# Patient Record
Sex: MALE | Race: WHITE | Employment: OTHER | URBAN - METROPOLITAN AREA
[De-identification: names, ages, dates, MRNs, and addresses within clinical notes are randomized per-mention and may not be internally consistent; named-entity substitution may affect disease eponyms.]

---

## 2023-07-20 ENCOUNTER — HOSPITAL ENCOUNTER (EMERGENCY)
Age: 53
Discharge: HOME | End: 2023-07-20
Attending: EMERGENCY MEDICINE
Payer: MEDICARE

## 2023-07-20 VITALS
WEIGHT: 165 LBS | HEIGHT: 68 IN | HEART RATE: 89 BPM | TEMPERATURE: 98.1 F | OXYGEN SATURATION: 96 % | RESPIRATION RATE: 16 BRPM | BODY MASS INDEX: 25.01 KG/M2 | SYSTOLIC BLOOD PRESSURE: 151 MMHG | DIASTOLIC BLOOD PRESSURE: 102 MMHG

## 2023-07-20 DIAGNOSIS — F22 EKBOM'S DELUSIONAL PARASITOSIS (HCC): Primary | ICD-10-CM

## 2023-07-20 PROCEDURE — 99283 EMERGENCY DEPT VISIT LOW MDM: CPT

## 2023-07-20 ASSESSMENT — PAIN - FUNCTIONAL ASSESSMENT: PAIN_FUNCTIONAL_ASSESSMENT: NONE - DENIES PAIN
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
## 2023-07-20 NOTE — DISCHARGE INSTRUCTIONS
The crisis number for [facility] is [phone] (SAVE). This crisis line is available 24 hours a day, seven days a week. You are being referred to the Intensive Outpatient Program here at [facility]. Please contact the Intensive Outpatient  at [phone] to schedule your first appointment. Outpatient Mental Health Treatment Services    Mental Health Therapy and Psychiatry (Medication Management)    [facility]  Location: [facility], [address] [place], [address] [facility]  Phone: [phone] [facility] Scheduling)    [address] [place] [address].   Location: [address] [facility], [place], [place], [address] Phone: [phone]    Please go to [address] [place] [address] Monday through Thursday between 8am-9pm, or Friday between 8am and 4:30pm. Please allow 45 minutes for the registration process.     [facility]  Location: [address] [place] [address]., [facility], [facility] [address]  Phone: [phone]    [facility] (telehealth options)  Location: Multiple locations across [address] [facility]  Phone: [phone]     [facility]  Location: [address] [facility]  Phone: [phone]    [facility]  Location: [address] [place] [address], [address] [place] [address], [address]  Phone: [phone]    [address] (telehealth options)  Location: Multiple offices in the [place] area  Phone: [phone] or [phone]    [address] [place] [address]  Locations: Multiple locations in [facility] and [place]  Phone: [phone]    The [address] [facility]  Location: [address] [facility], [place], [address] [facility] [address]  Phone: [phone] [facility]  Location: Multiple offices in [facility]   Phone: [phone])    Chase Maurer  Location: [facility] [address]  Phone: [phone]    [facility] [address] [facility])  Location: [address] [facility],[address]